# Patient Record
(demographics unavailable — no encounter records)

---

## 2025-01-29 NOTE — PROCEDURE
[de-identified] : LARYNGOSCOPY EXAM:  Indication:  dysphagia -Verbal consent was obtained from patient prior to procedure. -Oxymetazoline 0.05% and lidocaine 2% spray applied to the nasal cavities. Flexible laryngoscopy was performed via right nostril and revealed the following:   -- Nasopharynx had no mass or exudate.   -- Base of tongue was symmetric   -- Vallecula was clear   -- Markedly collapsing lateral pharyngeal walls.   -- Epiglottis, both aryepiglottic folds and both false vocal folds were normal   -- Arytenoids both with mild edema and no erythema    -- True vocal folds were fully mobile and without lesions.    -- Post cricoid area and piriform sinuses were clear.   -- Interarytenoid edema was present.   -- No lesion seen in laryngopharynx The patient tolerated the procedure well.

## 2025-01-29 NOTE — HISTORY OF PRESENT ILLNESS
[de-identified] : Ms. CABRERA is a 82 year old woman who is self-referred for thyroid check.  For past few years, she has chronic intermittent weak voice, vocal strain, throat discomfort after talking, and difficulty swallowing.  She cannot eat fast or she will choke.  Sometimes chokes on food getting stuck in her throat.  No change in diet consistency.  No unintentional wt loss.  Has difficulty breathing that responds to an inhaler. She had similar symptoms that were relieved after partial thyroidectomy ("I had a lump") 15 years ago, so she thinks that thyroid may again be the problem.  She reports that last US neck was done over 3 years ago.  She is not on thyroid medication. Occasional heartburn and acid in stomach. She does not notice enlargement in her neck.  No neck pressure. Family history is negative for thyroid disease/cancer No history of radiation other than routine imaging. Uses CPAP sometimes for GABRIELLA.  Lately, not sleeping well due to awakenings.  Feels sleepy during day.

## 2025-01-29 NOTE — ASSESSMENT
[FreeTextEntry1] : Ms. CABRERA is a 82-year-old woman who was evaluated for the following issues today:   1.) enlarged thyroid, s/p partial thyroidectomy (right lobe/isthmus?) 15 years ago.  Left lobe feels like it may be low and enlarged --> US thyroid --> TSH and free T4 levels   2.) Voice disturbance, pharyngoesophageal dysphagia and chronic throat clearing. - may be from the goiter, but also factor reflux in --> reflux precautions reviewed with her, and written instructions provided. --> famotidine 40 mg at bedtime   3.) GABRIELLA - symptomatic and not compliant with CPAP use. --> recommended that she use CPAP every night.   Return in 1 month

## 2025-01-29 NOTE — CONSULT LETTER
[Dear  ___] : Dear  [unfilled], [Courtesy Letter:] : I had the pleasure of seeing your patient, [unfilled], in my office today. [Please see my note below.] : Please see my note below. [Sincerely,] : Sincerely, [FreeTextEntry2] : Santos Aguirre MD [address] [place] [id number]  [FreeTextEntry3] :  Jada Raphael MD  Otolaryngology, Head and Neck Surgery

## 2025-01-29 NOTE — PHYSICAL EXAM
[Midline] : trachea located in midline position [Removed] : palatine tonsils previously removed [Laryngoscopy Performed] : laryngoscopy was performed, see procedure section for findings [Normal] : no rashes [FreeTextEntry1] : No hoarseness. Wears bilateral hearing aids. [de-identified] : No mass. [de-identified] : Well-healed low anterior neck scar.  Trachea easily palpable.  Left thyroid lobe feels like may be low and enlarged; Right lobe not palpable. [de-identified] : Mallampati 3 airway [de-identified] : Carotid pulses 2+ bilateral.

## 2025-01-29 NOTE — REVIEW OF SYSTEMS
[As Noted in HPI] : as noted in HPI [Sleep Disturbances] : sleep disturbances [FreeTextEntry8] : excess urination

## 2025-03-05 NOTE — HISTORY OF PRESENT ILLNESS
[de-identified] : INITIAL VISIT 01/29/2025 Ms. CABRERA is an 82 year old woman who is self-referred for thyroid check. For past few years, she has chronic intermittent weak voice, vocal strain, throat discomfort after talking, and difficulty swallowing.  She cannot eat fast or she will choke.  Sometimes chokes on food getting stuck in her throat.  No change in diet consistency.  No unintentional wt loss.  Has difficulty breathing that responds to an inhaler. She had similar symptoms that were relieved after partial thyroidectomy ("I had a lump") 15 years ago, so she thinks that thyroid may again be the problem.  She reports that last US neck was done over 3 years ago.  She is not on thyroid medication. Occasional heartburn and acid in stomach. She does not notice enlargement in her neck.  No neck pressure. Family history is negative for thyroid disease/cancer No history of radiation other than routine imaging. Uses CPAP sometimes for GABRIELLA.  Lately, not sleeping well due to awakenings.  Feels sleepy during day.   VISIT 3/05/2025    CYTOLOGY REPORT (3/31/2026), read at [facility] FNA THYROID, LEFT, 3.2 cm - BENIGN FINDINGS (Category II). Adenomatous nodular goiter with prominent cystic changes. (see note) Note: Moderately cellular specimen consisting of many groups of bland follicular cells, few with Hurthle cell features, in  cohesive groups and macrofollicles with perifollicular fibrosis, in a background of abundant macrophages and watery colloid with blood.